# Patient Record
(demographics unavailable — no encounter records)

---

## 2025-03-18 NOTE — HISTORY OF PRESENT ILLNESS
[FreeTextEntry1] : 29 y/o G0 female presents for annual exam. Last seen Feb 2024, neg pap. She currently takes OCP. Pt is doing well, no complaints.  GYNHx:  ASCUS HPV+ PAP in 2019.  Pt is planning to leave job and become a .  She has a new partner, who is from [place]. She lives in [facility]

## 2025-03-18 NOTE — PLAN
[FreeTextEntry1] :  28 year old G0 female presents for annual exam.  -Refill OCP -Pap UTD -GC/CT   -RTO 1 year

## 2025-03-18 NOTE — END OF VISIT
[FreeTextEntry3] : I, Holly Laguna, acted as a scribe on behalf of Dr. Holly Mathews M.D. on 03/18/2025 .   All medical entries made by the scribe were at my Dr. Holly Mathews M.D direction and personally dictated by me on  03/18/2025 . I have reviewed the chart and agree that the record accurately reflects my personal performance of the history, physical exam, assessment and plan. I have also personally directed, reviewed, and agreed with the chart.